# Patient Record
Sex: MALE | Race: WHITE
[De-identification: names, ages, dates, MRNs, and addresses within clinical notes are randomized per-mention and may not be internally consistent; named-entity substitution may affect disease eponyms.]

---

## 2019-08-15 ENCOUNTER — HOSPITAL ENCOUNTER (EMERGENCY)
Dept: HOSPITAL 39 - ER | Age: 59
Discharge: ANOTHER HOSPITAL | End: 2019-08-15
Payer: MEDICARE

## 2019-08-15 VITALS — DIASTOLIC BLOOD PRESSURE: 48 MMHG | OXYGEN SATURATION: 96 % | TEMPERATURE: 99.1 F | SYSTOLIC BLOOD PRESSURE: 101 MMHG

## 2019-08-15 DIAGNOSIS — Z79.84: ICD-10-CM

## 2019-08-15 DIAGNOSIS — N50.89: ICD-10-CM

## 2019-08-15 DIAGNOSIS — I13.0: ICD-10-CM

## 2019-08-15 DIAGNOSIS — Z95.1: ICD-10-CM

## 2019-08-15 DIAGNOSIS — Z79.899: ICD-10-CM

## 2019-08-15 DIAGNOSIS — R30.0: ICD-10-CM

## 2019-08-15 DIAGNOSIS — Z87.891: ICD-10-CM

## 2019-08-15 DIAGNOSIS — E11.22: ICD-10-CM

## 2019-08-15 DIAGNOSIS — I21.4: Primary | ICD-10-CM

## 2019-08-15 DIAGNOSIS — J44.9: ICD-10-CM

## 2019-08-15 DIAGNOSIS — N18.4: ICD-10-CM

## 2019-08-15 DIAGNOSIS — N50.811: ICD-10-CM

## 2019-08-15 DIAGNOSIS — I50.9: ICD-10-CM

## 2019-08-15 DIAGNOSIS — R10.30: ICD-10-CM

## 2019-08-15 PROCEDURE — 81001 URINALYSIS AUTO W/SCOPE: CPT

## 2019-08-15 PROCEDURE — 85730 THROMBOPLASTIN TIME PARTIAL: CPT

## 2019-08-15 PROCEDURE — 93005 ELECTROCARDIOGRAM TRACING: CPT

## 2019-08-15 PROCEDURE — 82550 ASSAY OF CK (CPK): CPT

## 2019-08-15 PROCEDURE — 74176 CT ABD & PELVIS W/O CONTRAST: CPT

## 2019-08-15 PROCEDURE — 80048 BASIC METABOLIC PNL TOTAL CA: CPT

## 2019-08-15 PROCEDURE — 82553 CREATINE MB FRACTION: CPT

## 2019-08-15 PROCEDURE — 85610 PROTHROMBIN TIME: CPT

## 2019-08-15 PROCEDURE — 76870 US EXAM SCROTUM: CPT

## 2019-08-15 PROCEDURE — 84484 ASSAY OF TROPONIN QUANT: CPT

## 2019-08-15 PROCEDURE — 80076 HEPATIC FUNCTION PANEL: CPT

## 2019-08-15 PROCEDURE — 85025 COMPLETE CBC W/AUTO DIFF WBC: CPT

## 2019-08-15 PROCEDURE — 36415 COLL VENOUS BLD VENIPUNCTURE: CPT

## 2019-08-15 PROCEDURE — 83605 ASSAY OF LACTIC ACID: CPT

## 2019-08-15 NOTE — US
Study: Scrotal sonogram.



Indication: pain



Technique: Multiplanar grayscale Doppler sonographic images of

the scrotum obtained.



Findings:



The right testicle measures 4.5 x 3.3 x 2.3.



The left testicle surgically absent .



The right epididymal head measures 1.0 cm.



Appropriate color Doppler flow to the right testicle which

demonstrates a normal appearance. 



Hyperechoic tissue noted within the left scrotum concerning for

fat-containing a inguinal hernia.



No hydrocele. 



No varicocele. 



Impression: 



Normal sonographic appearance of the right testicle.



Hyperechoic tissue noted within the left scrotum concerning for

fat-containing a inguinal hernia. CT could better evaluate as

clinically indicated.



Electronically signed by:  Mark Rivera MD  8/15/2019 1:11 PM CDT

Workstation: [id number]

## 2019-08-15 NOTE — CT
EXAM DESCRIPTION: Abdoment/Pelvis w/o Contrast



CLINICAL HISTORY: 59 years Male, pelvic pain/renal insufficiency



COMPARISON: None available.



TECHNIQUE: Contiguous 3 mm axial images were obtained from the

lung bases to the level of the proximal femora without the

administration of intravenous or oral contrast. Sagittal and

coronal reconstructions were reviewed.



FINDINGS: Limited evaluation of the solid organs due to the lack

of intravenous contrast.



THORAX: The heart is mildly enlarged in size. Bilateral pleural

effusions, larger on the left with associated airspace opacities

of the bilateral lower lobes, representing atelectasis and/or

pneumonia.



LIVER: The liver demonstrates normal size and density with no

intrahepatic biliary ductal dilatation.



GALLBLADDER: Significant gallbladder wall edema. No radiopaque

gallstones are visualized.



PANCREAS: Appears normal with no cystic or solid lesions.



SPLEEN: Normal



ADRENAL GLANDS: Normal with no nodules or masses.



KIDNEYS: Nonobstructive calculi measuring up to 4 mm are noted in

both kidneys. Few simple cysts are identified in the left kidney.

3.6 x 3.1 cm cyst with septation and calcification is identified

in the anterior aspect of the interpolar region of the left

kidney. This probably represents a Bosniak type II cyst. A simple

cyst is identified in the inferior pole of the left kidney.

Bilateral perinephric stranding is identified. The visualized

ureters appear grossly unremarkable.



STOMACH: The stomach is well-distended with no gross abnormality.



SMALL BOWEL: The small bowel loops demonstrate variable degrees

of distention with no abnormal dilatation or other signs to

suggest bowel obstruction.



LARGE BOWEL: Significant wall thickening and inflammatory

stranding of the ascending colon is noted. Findings could be

secondary to third spacing versus colitis. The appendix is not

definitively seen.



No evidence of free intraperitoneal air or fluid.



RETROPERITONEUM: The abdominal aorta is nonaneurysmal with

moderate to severe atherosclerosis. The inferior vena cava is

normal in size and caliber. Multiple subcentimeter

retroperitoneal lymph nodes are identified which are probably

reactive in nature.



URINARY BLADDER: Mild circumferential wall thickening of the

urinary bladder is noted.



The prostate gland seminal vesicles appear normal.



ADDITIONAL FINDINGS: Small fat-containing right inguinal hernia

is noted. 



BONES:  Mild degenerative changes are identified in the

visualized bones.No evidence of osteophytic or osteoblastic

lesions.



IMPRESSION:



1. Bilateral pleural effusions larger on the left side with

associated airspace opacities, representing atelectasis and/or

pneumonia.

2. Nonobstructive nephrolithiasis of both kidneys.

3. Significant gallbladder wall edema, bilateral perinephric

stranding is noted. Small volume ascites is also identified.

Findings most likely represent sequelae of third spacing.

4. The ascending colon demonstrates wall thickening and

inflammatory stranding. Findings could represent colitis versus

changes related to volume overload.



This exam was performed according to our departmental

dose-optimization program, which includes automated exposure

control, adjustment of the mA and/or kV according to patient size

and/or use of iterative reconstruction technique.





Electronically signed by:  Shania Sams MD  8/15/2019 10:08

AM CDT Workstation: [id number]

## 2019-08-15 NOTE — ED.PDOC
History of Present Illness





- General


Chief Complaint:  Problem


Stated Complaint: testicular swelling


Time Seen by Provider: 08/15/19 08:01


Source: patient


Exam Limitations: no limitations





- History of Present Illness


Initial Comments: 





Jerome Dixon 60 y/o male resident at [facility] came to ER with pain 

genitalia for the last one week stated was prescribed steroids but have not 

taken any.Has history of DM2 uncontrolled.Had also pain on urination.





Timing/Duration: week - one


Quality: moderate, dull, steady


Radiation: scrotal


Activites at Onset: none


Prior abdominal problems: none


Sexual intercourse history: not active


Worsening Factors: nothing


Associated Symptoms: dysuria


Allergies/Adverse Reactions: 


Allergies





NO KNOWN ALLERGY Allergy (Unverified 13 09:51)


   





Home Medications: 


Ambulatory Orders





Acetaminophen [Tylenol Arthritis Pain] 650 mg PO Q4H PRN 16 


Albuterol Inhaler [Ventolin Hfa Inhaler] 2 inh INH Q4H PRN 16 


Carvedilol [Coreg] 6.25 mg PO BID 16 


Fluticasone/Salmeterol 500/50 [Advair 500/50 Diskus] 1 puff INH DAILY 16 


Furosemide Tab [Lasix Tab] 40 mg PO QD@0900,1700 16 


Levalbuterol HCl 1.25 mg IN Q4H PRN 16 


Lisinopril 40 mg PO DAILY 16 


Metformin HCl [Glucophage] 500 mg PO BID 16 


Simvastatin 20 mg PO DAILY 16 


Spironolactone 25 mg PO DAILY 16 


Tiotropium Bromide Monohydrate [Spiriva Handihaler] 1 puff IN DAILY 16 


Ondansetron HCl [Zofran] 4 mg PO Q4HR PRN #8 tab 16 


Sulfa/Trimeth 800/160 (Ds) Tab [Bactrim DS Tab] 1 ea PO BIDFD #20 tab 16 











Review of Systems





- Review of Systems


Genitourinary: States: see HPI


All other Systems: Reviewed and Negative, No Change from Baseline





Past Medical History (General)





- Patient Medical History


Hx Seizures: No


Hx Stroke: No


Hx Asthma: Yes


Hx of COPD: Yes


Hx Cardiac Disorders: Yes


Hx Congestive Heart Failure: Yes


Hx Pacemaker: No


Hx Hypertension: Yes


Hx Diabetes: Yes


Hx MRSA: Yes


Hx Other PMH: Yes - treatment for genital wart


MRSA Source:: inscision


Surgical History: coronary bypass surgery, other - right orchiectomy;left BKA





- Vaccination History


Hx Influenza Vaccination: Yes - 


Hx Pneumococcal Vaccination: Yes - 





- Social History


Hx Tobacco Use: Yes


Hx Alcohol Use: No


Hx Substance Use: No


Hx Physical Abuse: No


Hx Emotional Abuse: No





Family Medical History





- Family History


  ** Father


Living Status: 


Cause of Death: MI


Hx Cardiac Disease: Yes





Physical Exam





- Physical Exam


General Appearance: Alert, Anxious, No apparent distress


Eyes, Ears, Nose, Throat Exam: normal ENT inspection, pharynx normal


Neck: supple


Cardiovascular/Respiratory: regular rate, rhythm, normal peripheral pulses, 

normal breath sounds, murmur - 2nd LICS


Gastrointestinal/Abdominal: soft, no organomegaly, tenderness - lower abdomen


Male Genital Exam: normal prostate, epididymal tenderness, inguinal tenderness, 

scrotum tenderness (R), urethral discharge, other - scrotal tenederness nad 

swelling;tenderness perineum


Back Exam: no CVA tenderness, no vertebral tenderness


Extremity: no pedal edema, no calf tenderness, other - LBKA


Neurologic: alert, oriented x 3


Skin Exam: normal color, warm/dry





Progress





- Progress


Progress: 





08/15/19 08:29


                               Vital Signs - 8 hr











  08/15/19





  08:17


 


Temperature 99.6 F


 


Pulse Rate [ 78





left brachial] 


 


Respiratory 16





Rate 


 


Blood Pressure 96/49





[left brachial] 


 


O2 Sat by Pulse 91 L





Oximetry 














- Results/Orders


Results/Orders: 





                                        





08/15/19 09:45


EKG STAT 





08/15/19 10:58


BLOOD CULTURE Stat 





08/15/19 11:37


Catheter:[facility] QSHIFT 








                         Laboratory Results - last 24 hr











  08/15/19 08/15/19 08/15/19





  08:42 08:42 10:37


 


WBC  12.6 H  


 


RBC  2.76 L  


 


Hgb  8.3 L  


 


Hct  25.0 L  


 


MCV  90.7  


 


MCH  30.3  


 


MCHC  33.4  


 


RDW  17.3 H  


 


Plt Count  165  


 


MPV  7.9  


 


Absolute Neuts (auto)  11.30 H  


 


Absolute Lymphs (auto)  0.40 L  


 


Absolute Monos (auto)  0.80  


 


Absolute Eos (auto)  0.00  


 


Absolute Basos (auto)  0.00  


 


Neutrophils %  90.0 H  


 


Lymphocytes %  3.5 L  


 


Monocytes %  6.0  


 


Eosinophils %  0.1 L  


 


Basophils %  0.4  


 


PT  11.3 H  


 


INR  1.13  


 


PTT (SP)  34.0 H  


 


Sodium  131 L  


 


Potassium  4.1  


 


Chloride  96 L  


 


Carbon Dioxide  22  


 


Anion Gap  17.1  


 


BUN  63 H  


 


Creatinine  3.34 H  


 


BUN/Creatinine Ratio  18.9  


 


Random Glucose  114 H  


 


Serum Osmolality  281.5  


 


Lactic Acid   2.2 


 


Calcium  8.7  


 


Magnesium  1.6 L  


 


Total Bilirubin  0.7  


 


Direct Bilirubin  0.2  


 


Indirect Bilirubin  0.5  


 


AST  54 H  


 


ALT  86 H  


 


Alkaline Phosphatase  93  


 


Creatine Kinase  18 L  


 


CK-MB (CK-2)  2.1  


 


CK-MB (CK-2) %  Not Reportable  


 


Troponin I  0.78 H*  


 


Serum Total Protein  6.3 L  


 


Albumin  2.5 L  


 


Urine Color    Yellow


 


Urine Appearance    Sl cloudy


 


Urine pH    5.0


 


Ur Specific Gravity    1.020


 


Urine Protein    >=300 H


 


Urine Glucose (UA)    Negative


 


Urine Ketones    Negative


 


Urine Blood    Negative


 


Urine Nitrite    Negative


 


Urine Bilirubin    Small H


 


Urine Urobilinogen    0.2


 


Ur Leukocyte Esterase    Negative


 


Urine RBC    0-1


 


Urine WBC    1-3


 


Ur Epithelial Cells    0


 


Amorphous Sediment    3+


 


Urine Bacteria    1+


 


Hyaline Casts    0-1


 


Urine Yeast    1+














- EKG/XRAY/CT


EKG: Sinus, LVH, no ST T wave changes


Comments: HR-73


CT Ordered: Yes - see full report





Departure





- Departure


Clinical Impression: 


 NSTEMI (non-ST elevated myocardial infarction), CKD (chronic kidney disease) 

stage 4, GFR 15-29 ml/min, Scrotal swelling





Abdominal pain


Qualifiers:


 Abdominal location: lower abdomen, unspecified Qualified Code(s): R10.30 - 

Lower abdominal pain, unspecified





Time of Disposition: 14:57


Disposition: Transfer to Hospital


Condition: Fair


Departure Forms:  Patient Portal Self Enrollment


Referrals: 


ANGELITA CAN [Primary Care Provider] - 1-2 Weeks


Home Medications: 


Ambulatory Orders





Acetaminophen [Tylenol Arthritis Pain] 650 mg PO Q4H PRN 16 


Albuterol Inhaler [Ventolin Hfa Inhaler] 2 inh INH Q4H PRN 16 


Carvedilol [Coreg] 6.25 mg PO BID 16 


Fluticasone/Salmeterol 500/50 [Advair 500/50 Diskus] 1 puff INH DAILY 16 


Furosemide Tab [Lasix Tab] 40 mg PO QD@0900,1700 16 


Levalbuterol HCl 1.25 mg IN Q4H PRN 16 


Lisinopril 40 mg PO DAILY 16 


Metformin HCl [Glucophage] 500 mg PO BID 16 


Simvastatin 20 mg PO DAILY 16 


Spironolactone 25 mg PO DAILY 16 


Tiotropium Bromide Monohydrate [Spiriva Handihaler] 1 puff IN DAILY 16 


Ondansetron HCl [Zofran] 4 mg PO Q4HR PRN #8 tab 16 


Sulfa/Trimeth 800/160 (Ds) Tab [Bactrim DS Tab] 1 ea PO BIDFD #20 tab 16 











Transfer to Outside Facility





- Transfer Information


Accepting Provider:: Dr. Pavon-LITO Adan


Accepting Facility: [facility]


Reason for Transfer: required specialist not available - GI;;Cardiologist